# Patient Record
Sex: FEMALE | Race: WHITE | NOT HISPANIC OR LATINO | URBAN - METROPOLITAN AREA
[De-identification: names, ages, dates, MRNs, and addresses within clinical notes are randomized per-mention and may not be internally consistent; named-entity substitution may affect disease eponyms.]

---

## 2020-06-02 ENCOUNTER — LAB OUTSIDE AN ENCOUNTER (OUTPATIENT)
Dept: URBAN - METROPOLITAN AREA CLINIC 80 | Facility: CLINIC | Age: 80
End: 2020-06-02

## 2020-06-15 ENCOUNTER — OFFICE VISIT (OUTPATIENT)
Dept: URBAN - METROPOLITAN AREA CLINIC 80 | Facility: CLINIC | Age: 80
End: 2020-06-15
Payer: MEDICARE

## 2020-06-15 DIAGNOSIS — K59.01 CONSTIPATION: ICD-10-CM

## 2020-06-15 DIAGNOSIS — K82.8 BILIARY DYSKINESIA: ICD-10-CM

## 2020-06-15 DIAGNOSIS — R10.9 ABDOMINAL PAIN: ICD-10-CM

## 2020-06-15 DIAGNOSIS — I16.0 HYPERTENSIVE URGENCY: ICD-10-CM

## 2020-06-15 DIAGNOSIS — R10.31 RLQ ABDOMINAL PAIN: ICD-10-CM

## 2020-06-15 PROCEDURE — G9903 PT SCRN TBCO ID AS NON USER: HCPCS | Performed by: INTERNAL MEDICINE

## 2020-06-15 PROCEDURE — G8427 DOCREV CUR MEDS BY ELIG CLIN: HCPCS | Performed by: INTERNAL MEDICINE

## 2020-06-15 PROCEDURE — 99214 OFFICE O/P EST MOD 30 MIN: CPT | Performed by: INTERNAL MEDICINE

## 2020-06-15 PROCEDURE — G8417 CALC BMI ABV UP PARAM F/U: HCPCS | Performed by: INTERNAL MEDICINE

## 2020-06-15 RX ORDER — LIDOCAINE 50 MG/G
PATCH CUTANEOUS
Qty: 0 | Refills: 0 | Status: ACTIVE | COMMUNITY
Start: 1900-01-01

## 2020-06-15 RX ORDER — ERGOCALCIFEROL 1.25 MG/1
CAPSULE ORAL
Qty: 0 | Refills: 0 | Status: ACTIVE | COMMUNITY
Start: 1900-01-01

## 2020-06-15 NOTE — HPI-TODAY'S VISIT:
History Of Present Illness     79 yr old female seen previously for constipation and abdominal pain last seen in 3/2020 comes in for follow up. previously seen for worsening diarrhea and post-prandial gas and pain.  prior work up done with normal cbc, cmp.  unremarkable ruq u/s.  no relief with bentyl.  zantac helps.  egd in 1/2019 with gastritis but no hp.  she had a ct in 4/2019 with no acute findings.  stable liver cysts.  normal gallbladder, pancreas, kidneys.  hida was ordered but not done.  fecal elastace was normal on 3/2020.  zantac bid has not been helpful. she comes in for ongoing symptoms.  now noted having constipation primarily stable on benefiber and magnesium she never took the xifaxan still with rlq pain that is dull and moving.  worse with eating had hida 2 weeks ago.  mildly abnormal function.  denies any pain or discomfort with the test she notes normal appetite along with early satiety.   no further n/v.  of note, her bp is 200/80.  no chest pain. no headache.  no blurry visition.  notes issues with tooth abscess and has appt with dentist today.  discussed need for er evaluation.    prior work up with EGD in 1/2019 notable for gastritis and no hpylori.  colonoscopy in 6/2018 with diverticulosis but no polyps.    pt s/p total hysterecotomy.   no new complaints.

## 2020-07-07 ENCOUNTER — OFFICE VISIT (OUTPATIENT)
Dept: URBAN - METROPOLITAN AREA CLINIC 80 | Facility: CLINIC | Age: 80
End: 2020-07-07

## 2020-07-08 ENCOUNTER — TELEPHONE ENCOUNTER (OUTPATIENT)
Dept: URBAN - METROPOLITAN AREA CLINIC 92 | Facility: CLINIC | Age: 80
End: 2020-07-08

## 2020-07-13 ENCOUNTER — OFFICE VISIT (OUTPATIENT)
Dept: URBAN - METROPOLITAN AREA CLINIC 80 | Facility: CLINIC | Age: 80
End: 2020-07-13
Payer: MEDICARE

## 2020-07-13 ENCOUNTER — WEB ENCOUNTER (OUTPATIENT)
Dept: URBAN - METROPOLITAN AREA CLINIC 80 | Facility: CLINIC | Age: 80
End: 2020-07-13

## 2020-07-13 DIAGNOSIS — R14.0 BLOATING: ICD-10-CM

## 2020-07-13 DIAGNOSIS — R10.84 ABDOMINAL PAIN, GENERALIZED: ICD-10-CM

## 2020-07-13 DIAGNOSIS — R10.31 RLQ ABDOMINAL PAIN: ICD-10-CM

## 2020-07-13 DIAGNOSIS — K82.8 BILIARY DYSKINESIA: ICD-10-CM

## 2020-07-13 DIAGNOSIS — K59.01 CONSTIPATION: ICD-10-CM

## 2020-07-13 PROBLEM — 443482000 HYPERTENSIVE URGENCY: Status: ACTIVE | Noted: 2020-06-15

## 2020-07-13 PROCEDURE — 1036F TOBACCO NON-USER: CPT | Performed by: INTERNAL MEDICINE

## 2020-07-13 PROCEDURE — G8427 DOCREV CUR MEDS BY ELIG CLIN: HCPCS | Performed by: INTERNAL MEDICINE

## 2020-07-13 PROCEDURE — G9903 PT SCRN TBCO ID AS NON USER: HCPCS | Performed by: INTERNAL MEDICINE

## 2020-07-13 PROCEDURE — 99214 OFFICE O/P EST MOD 30 MIN: CPT | Performed by: INTERNAL MEDICINE

## 2020-07-13 PROCEDURE — G8417 CALC BMI ABV UP PARAM F/U: HCPCS | Performed by: INTERNAL MEDICINE

## 2020-07-13 RX ORDER — ERGOCALCIFEROL 1.25 MG/1
CAPSULE ORAL
Qty: 0 | Refills: 0 | Status: ACTIVE | COMMUNITY
Start: 1900-01-01

## 2020-07-13 RX ORDER — NITAZOXANIDE 500 MG/1
1 TABLET WITH FOOD TABLET ORAL
Qty: 6 TABLET | Refills: 0 | OUTPATIENT
Start: 2020-07-13

## 2020-07-13 RX ORDER — LIDOCAINE 50 MG/G
PATCH CUTANEOUS
Qty: 0 | Refills: 0 | Status: ACTIVE | COMMUNITY
Start: 1900-01-01

## 2020-07-13 RX ORDER — LISINOPRIL 10 MG/1
1 TABLET TABLET ORAL ONCE A DAY
Status: ACTIVE | COMMUNITY

## 2020-10-13 ENCOUNTER — OFFICE VISIT (OUTPATIENT)
Dept: URBAN - METROPOLITAN AREA CLINIC 80 | Facility: CLINIC | Age: 80
End: 2020-10-13
Payer: MEDICARE

## 2020-10-13 DIAGNOSIS — K82.8 BILIARY DYSKINESIA: ICD-10-CM

## 2020-10-13 DIAGNOSIS — K59.01 CONSTIPATION: ICD-10-CM

## 2020-10-13 DIAGNOSIS — Z86.010 HISTORY OF COLON POLYPS: ICD-10-CM

## 2020-10-13 DIAGNOSIS — R10.31 RLQ ABDOMINAL PAIN: ICD-10-CM

## 2020-10-13 DIAGNOSIS — R14.0 BLOATING: ICD-10-CM

## 2020-10-13 DIAGNOSIS — K21.9 GERD: ICD-10-CM

## 2020-10-13 DIAGNOSIS — R68.84 JAW PAIN: ICD-10-CM

## 2020-10-13 DIAGNOSIS — R10.9 ABDOMINAL PAIN: ICD-10-CM

## 2020-10-13 PROCEDURE — G9903 PT SCRN TBCO ID AS NON USER: HCPCS | Performed by: INTERNAL MEDICINE

## 2020-10-13 PROCEDURE — G8417 CALC BMI ABV UP PARAM F/U: HCPCS | Performed by: INTERNAL MEDICINE

## 2020-10-13 PROCEDURE — G8427 DOCREV CUR MEDS BY ELIG CLIN: HCPCS | Performed by: INTERNAL MEDICINE

## 2020-10-13 PROCEDURE — 99213 OFFICE O/P EST LOW 20 MIN: CPT | Performed by: INTERNAL MEDICINE

## 2020-10-13 PROCEDURE — G8484 FLU IMMUNIZE NO ADMIN: HCPCS | Performed by: INTERNAL MEDICINE

## 2020-10-13 RX ORDER — LISINOPRIL 10 MG/1
1 TABLET TABLET ORAL ONCE A DAY
Status: ACTIVE | COMMUNITY

## 2020-10-13 RX ORDER — NITAZOXANIDE 500 MG/1
1 TABLET WITH FOOD TABLET ORAL
Qty: 6 TABLET | Refills: 0 | Status: ACTIVE | COMMUNITY
Start: 2020-07-13

## 2020-10-13 RX ORDER — LIDOCAINE 50 MG/G
PATCH CUTANEOUS
Qty: 0 | Refills: 0 | Status: ACTIVE | COMMUNITY
Start: 1900-01-01

## 2020-10-13 RX ORDER — ERGOCALCIFEROL 1.25 MG/1
CAPSULE ORAL
Qty: 0 | Refills: 0 | Status: ACTIVE | COMMUNITY
Start: 1900-01-01

## 2020-10-13 NOTE — HPI-TODAY'S VISIT:
80 yr old female seen previously for constipation and abdominal pain last seen in 7/2020 comes in for follow up.  previously seen for worsening diarrhea and post-prandial gas and pain.  prior work up done with normal cbc, cmp.  unremarkable ruq u/s.  no relief with bentyl.  zantac helps.  egd in 1/2019 with gastritis but no hp.  she had a ct in 4/2019 with no acute findings.  stable liver cysts.  normal gallbladder, pancreas, kidneys.  hida was ordered but not done.  fecal elastace was normal on 3/2020.  zantac bid has not been helpful. given xifaxan for sibo but didn't tolerated it.  i gave her alinia last time.  she comes in for ongoing symptoms.  she comes in primarily complaining of continued jaw pain she has had a tooth pulled without relief she did not take the alinia.  she was given pain med and neurontin for her pain and didn't want to mix the meds her jaw pain has not improved.   she notes that her constipation is improved on benefiber and magnesium she did have some blood a few weeks ago with brb on wiping that lasted 2 days sounds like hemorrhoids given normal brown stools still notes occ bloating eating well no rlq pain normal appetite  no further n/v no dysphagia no new complaints.  prior work up with EGD in 1/2019 notable for gastritis and no hpylori.  colonoscopy in 6/2018 with diverticulosis but no polyps.  hida with mildly abnormal function.  no pain during test.  pt s/p total hysterecotomy.

## 2021-01-12 ENCOUNTER — OFFICE VISIT (OUTPATIENT)
Dept: URBAN - METROPOLITAN AREA SURGERY CENTER 19 | Facility: SURGERY CENTER | Age: 81
End: 2021-01-12

## 2021-01-19 ENCOUNTER — OFFICE VISIT (OUTPATIENT)
Dept: URBAN - METROPOLITAN AREA CLINIC 80 | Facility: CLINIC | Age: 81
End: 2021-01-19
Payer: MEDICARE

## 2021-01-19 VITALS
WEIGHT: 154.8 LBS | HEART RATE: 70 BPM | SYSTOLIC BLOOD PRESSURE: 118 MMHG | BODY MASS INDEX: 26.43 KG/M2 | HEIGHT: 64 IN | DIASTOLIC BLOOD PRESSURE: 78 MMHG | TEMPERATURE: 97.3 F

## 2021-01-19 DIAGNOSIS — R14.0 BLOATING: ICD-10-CM

## 2021-01-19 DIAGNOSIS — K21.9 GERD: ICD-10-CM

## 2021-01-19 DIAGNOSIS — R68.84 JAW PAIN: ICD-10-CM

## 2021-01-19 DIAGNOSIS — Z86.010 HISTORY OF COLON POLYPS: ICD-10-CM

## 2021-01-19 DIAGNOSIS — R10.31 RLQ ABDOMINAL PAIN: ICD-10-CM

## 2021-01-19 DIAGNOSIS — R10.9 ABDOMINAL PAIN: ICD-10-CM

## 2021-01-19 DIAGNOSIS — K59.01 CONSTIPATION: ICD-10-CM

## 2021-01-19 DIAGNOSIS — K82.8 BILIARY DYSKINESIA: ICD-10-CM

## 2021-01-19 PROCEDURE — 99214 OFFICE O/P EST MOD 30 MIN: CPT | Performed by: INTERNAL MEDICINE

## 2021-01-19 PROCEDURE — G8427 DOCREV CUR MEDS BY ELIG CLIN: HCPCS | Performed by: INTERNAL MEDICINE

## 2021-01-19 PROCEDURE — G9903 PT SCRN TBCO ID AS NON USER: HCPCS | Performed by: INTERNAL MEDICINE

## 2021-01-19 PROCEDURE — G8417 CALC BMI ABV UP PARAM F/U: HCPCS | Performed by: INTERNAL MEDICINE

## 2021-01-19 PROCEDURE — G8482 FLU IMMUNIZE ORDER/ADMIN: HCPCS | Performed by: INTERNAL MEDICINE

## 2021-01-19 RX ORDER — NEBIVOLOL HYDROCHLORIDE 10 MG/1
1 TABLET TABLET ORAL ONCE A DAY
Refills: 0 | Status: ACTIVE | COMMUNITY
Start: 1900-01-01

## 2021-01-19 RX ORDER — LIDOCAINE 50 MG/G
PATCH CUTANEOUS
Qty: 0 | Refills: 0 | Status: ACTIVE | COMMUNITY
Start: 1900-01-01

## 2021-01-19 RX ORDER — LORAZEPAM 1 MG/1
1 TABLET AT BEDTIME AS NEEDED TABLET ORAL ONCE A DAY
Refills: 0 | Status: ACTIVE | COMMUNITY
Start: 1900-01-01

## 2021-01-19 RX ORDER — ESTRADIOL 0.03 MG/D
1 PATCH TO SKIN PATCH TRANSDERMAL
Refills: 0 | Status: ACTIVE | COMMUNITY
Start: 1900-01-01

## 2021-01-19 RX ORDER — NITAZOXANIDE 500 MG/1
1 TABLET WITH FOOD TABLET ORAL
Qty: 6 TABLET | Refills: 0 | Status: ON HOLD | COMMUNITY
Start: 2020-07-13

## 2021-01-19 RX ORDER — LEVOTHYROXINE SODIUM 0.05 MG/1
1 TABLET IN THE MORNING ON AN EMPTY STOMACH TABLET ORAL ONCE A DAY
Status: ACTIVE | COMMUNITY

## 2021-01-19 RX ORDER — PRAVASTATIN SODIUM 40 MG/1
1 TABLET TABLET ORAL ONCE A DAY
Status: ACTIVE | COMMUNITY

## 2021-01-19 RX ORDER — TOBRAMYCIN AND DEXAMETHASONE 3; 1 MG/G; MG/G
1 APPLICATION INTO THE LOWER EYELID OF AFFECTED EYE OINTMENT OPHTHALMIC
Refills: 0 | Status: ACTIVE | COMMUNITY
Start: 1900-01-01

## 2021-01-19 RX ORDER — LISINOPRIL 10 MG/1
1 TABLET TABLET ORAL ONCE A DAY
Status: ACTIVE | COMMUNITY

## 2021-01-19 RX ORDER — ERGOCALCIFEROL 1.25 MG/1
CAPSULE ORAL
Qty: 0 | Refills: 0 | Status: ACTIVE | COMMUNITY
Start: 1900-01-01

## 2021-01-19 NOTE — HPI-TODAY'S VISIT:
80 yr old female seen previously for constipation and abdominal pain last seen in 10/2020 comes in for follow up.   last seen for her abdominal pain prevoiusly improved on nexium has stopped it since due to side effect from nexium but unsure what the side effect was still on protonix with mimimal relief pain is rlq area.  worse with prolonged sitting and with standing unrelated to eating or bm no dysphagia normal appetite stable weight.  normal stools with no diarrhea or constipaion prior hx of ibs but stable on fiber  prior work up done with normal cbc, cmp.  unremarkable ruq u/s.  no relief with bentyl.  zantac helps.   egd in 1/2019 with gastritis but no hp.   she had a ct in 4/2019 with no acute findings.  stable liver cysts.  normal gallbladder, pancreas, kidneys.   hida in 2020 with EF of 32% but had no pain during the test.   fecal elastace was normal on 3/2020. given xifaxan for sibo but didn't tolerated it.    prior work up with EGD in 1/2019 notable for gastritis and no hpylori.  colonoscopy in 6/2018 with diverticulosis but no polyps.   pt s/p total hysterecotomy.

## 2021-07-26 ENCOUNTER — OFFICE VISIT (OUTPATIENT)
Dept: URBAN - METROPOLITAN AREA CLINIC 80 | Facility: CLINIC | Age: 81
End: 2021-07-26
Payer: MEDICARE

## 2021-07-26 VITALS
HEART RATE: 54 BPM | WEIGHT: 155.6 LBS | HEIGHT: 64 IN | DIASTOLIC BLOOD PRESSURE: 71 MMHG | BODY MASS INDEX: 26.56 KG/M2 | SYSTOLIC BLOOD PRESSURE: 167 MMHG | TEMPERATURE: 97 F

## 2021-07-26 DIAGNOSIS — K21.9 GERD: ICD-10-CM

## 2021-07-26 DIAGNOSIS — K59.01 CONSTIPATION: ICD-10-CM

## 2021-07-26 DIAGNOSIS — K82.8 BILIARY DYSKINESIA: ICD-10-CM

## 2021-07-26 DIAGNOSIS — R10.9 ABDOMINAL PAIN: ICD-10-CM

## 2021-07-26 DIAGNOSIS — R14.0 BLOATING: ICD-10-CM

## 2021-07-26 DIAGNOSIS — R10.31 RLQ ABDOMINAL PAIN: ICD-10-CM

## 2021-07-26 DIAGNOSIS — R68.84 JAW PAIN: ICD-10-CM

## 2021-07-26 DIAGNOSIS — Z86.010 HISTORY OF COLON POLYPS: ICD-10-CM

## 2021-07-26 PROCEDURE — 99213 OFFICE O/P EST LOW 20 MIN: CPT | Performed by: INTERNAL MEDICINE

## 2021-07-26 RX ORDER — PRAVASTATIN SODIUM 40 MG/1
1 TABLET TABLET ORAL ONCE A DAY
Status: ACTIVE | COMMUNITY

## 2021-07-26 RX ORDER — LIDOCAINE 50 MG/G
PATCH CUTANEOUS
Qty: 0 | Refills: 0 | Status: ACTIVE | COMMUNITY
Start: 1900-01-01

## 2021-07-26 RX ORDER — SODIUM SULFATE, MAGNESIUM SULFATE, AND POTASSIUM CHLORIDE 17.75; 2.7; 2.25 G/1; G/1; G/1
12 TABLETS TABLET ORAL
Qty: 24 TABLETS | Refills: 0 | OUTPATIENT
Start: 2021-07-26 | End: 2021-07-27

## 2021-07-26 RX ORDER — ESTRADIOL 0.03 MG/D
1 PATCH TO SKIN PATCH TRANSDERMAL
Refills: 0 | Status: ACTIVE | COMMUNITY
Start: 1900-01-01

## 2021-07-26 RX ORDER — NITAZOXANIDE 500 MG/1
1 TABLET WITH FOOD TABLET ORAL
Qty: 6 TABLET | Refills: 0 | Status: ON HOLD | COMMUNITY
Start: 2020-07-13

## 2021-07-26 RX ORDER — LISINOPRIL 10 MG/1
1 TABLET TABLET ORAL ONCE A DAY
Status: ACTIVE | COMMUNITY

## 2021-07-26 RX ORDER — LORAZEPAM 1 MG/1
1 TABLET AT BEDTIME AS NEEDED TABLET ORAL ONCE A DAY
Refills: 0 | Status: ACTIVE | COMMUNITY
Start: 1900-01-01

## 2021-07-26 RX ORDER — ERGOCALCIFEROL 1.25 MG/1
CAPSULE ORAL
Qty: 0 | Refills: 0 | Status: ACTIVE | COMMUNITY
Start: 1900-01-01

## 2021-07-26 RX ORDER — NEBIVOLOL HYDROCHLORIDE 10 MG/1
1 TABLET TABLET ORAL ONCE A DAY
Refills: 0 | Status: ACTIVE | COMMUNITY
Start: 1900-01-01

## 2021-07-26 RX ORDER — LEVOTHYROXINE SODIUM 0.05 MG/1
1 TABLET IN THE MORNING ON AN EMPTY STOMACH TABLET ORAL ONCE A DAY
Status: ACTIVE | COMMUNITY

## 2021-07-26 RX ORDER — TOBRAMYCIN AND DEXAMETHASONE 3; 1 MG/G; MG/G
1 APPLICATION INTO THE LOWER EYELID OF AFFECTED EYE OINTMENT OPHTHALMIC
Refills: 0 | Status: ACTIVE | COMMUNITY
Start: 1900-01-01

## 2021-07-26 NOTE — HPI-TODAY'S VISIT:
80 yr old female seen previously for constipation and abdominal pain last seen in 10/2020 comes in for follow up.    last seen in 1/2021 for ruq abd pain.  thought to be functional vs mesh vs arthritis.  prevoiusly improved on nexium has stopped it since due to side effect from nexium but unsure what the side effect was still on protonix with mimimal relief pain is rlq area.  worse with prolonged sitting and with standing saw dr. nabila peter who did not think it was related to the mesh she was seen by neurology for her jaw pain.  given neurontin 600mg tid she comes in with improved jaw pain but no relief from ruq pain pain is unrelated to eating or bm occurs all day eating well no dysphagia normal appetite stable weight. stools are normal no diarrhea or constipaion prior hx of ibs but stable on fiber  prior work up done with normal cbc, cmp.  unremarkable ruq u/s.  no relief with bentyl.  zantac helps.   egd in 1/2019 with gastritis but no hp.   she had a ct in 4/2019 with no acute findings.  stable liver cysts.  normal gallbladder, pancreas, kidneys.   hida in 2020 with EF of 32% but had no pain during the test.   fecal elastace was normal on 3/2020. given xifaxan for sibo but didn't tolerated it.   colon in 2018 with diverticulosis but no polyps   pt s/p total hysterecotomy.

## 2021-07-29 ENCOUNTER — TELEPHONE ENCOUNTER (OUTPATIENT)
Dept: URBAN - METROPOLITAN AREA CLINIC 23 | Facility: CLINIC | Age: 81
End: 2021-07-29

## 2021-08-02 ENCOUNTER — TELEPHONE ENCOUNTER (OUTPATIENT)
Dept: URBAN - METROPOLITAN AREA CLINIC 92 | Facility: CLINIC | Age: 81
End: 2021-08-02

## 2021-08-02 RX ORDER — POLYETHYLENE GLYCOL 3350, SODIUM SULFATE, SODIUM CHLORIDE, POTASSIUM CHLORIDE, ASCORBIC ACID, SODIUM ASCORBATE 140-9-5.2G
AS DIRECTED KIT ORAL ONCE
Qty: 140 GRAM | Refills: 0 | OUTPATIENT
Start: 2021-08-02 | End: 2021-08-03

## 2021-10-07 ENCOUNTER — TELEPHONE ENCOUNTER (OUTPATIENT)
Dept: URBAN - METROPOLITAN AREA CLINIC 40 | Facility: CLINIC | Age: 81
End: 2021-10-07

## 2021-10-19 ENCOUNTER — OFFICE VISIT (OUTPATIENT)
Dept: URBAN - METROPOLITAN AREA SURGERY CENTER 19 | Facility: SURGERY CENTER | Age: 81
End: 2021-10-19
Payer: MEDICARE

## 2021-10-19 ENCOUNTER — CLAIMS CREATED FROM THE CLAIM WINDOW (OUTPATIENT)
Dept: URBAN - METROPOLITAN AREA CLINIC 4 | Facility: CLINIC | Age: 81
End: 2021-10-19
Payer: MEDICARE

## 2021-10-19 DIAGNOSIS — D12.2 ADENOMA OF ASCENDING COLON: ICD-10-CM

## 2021-10-19 DIAGNOSIS — R10.84 ABDOMINAL CRAMPING, GENERALIZED: ICD-10-CM

## 2021-10-19 DIAGNOSIS — K63.89 POLYP, HYPERPLASTIC: ICD-10-CM

## 2021-10-19 PROCEDURE — 45385 COLONOSCOPY W/LESION REMOVAL: CPT | Performed by: INTERNAL MEDICINE

## 2021-10-19 PROCEDURE — G8907 PT DOC NO EVENTS ON DISCHARG: HCPCS | Performed by: INTERNAL MEDICINE

## 2021-10-19 PROCEDURE — 88305 TISSUE EXAM BY PATHOLOGIST: CPT | Performed by: PATHOLOGY

## 2021-10-19 RX ORDER — PRAVASTATIN SODIUM 40 MG/1
1 TABLET TABLET ORAL ONCE A DAY
COMMUNITY

## 2021-10-19 RX ORDER — ERGOCALCIFEROL 1.25 MG/1
CAPSULE ORAL
Qty: 0 | Refills: 0 | COMMUNITY
Start: 1900-01-01

## 2021-10-19 RX ORDER — LORAZEPAM 1 MG/1
1 TABLET AT BEDTIME AS NEEDED TABLET ORAL ONCE A DAY
Refills: 0 | COMMUNITY
Start: 1900-01-01

## 2021-10-19 RX ORDER — NITAZOXANIDE 500 MG/1
1 TABLET WITH FOOD TABLET ORAL
Qty: 6 TABLET | Refills: 0 | COMMUNITY
Start: 2020-07-13

## 2021-10-19 RX ORDER — NEBIVOLOL HYDROCHLORIDE 10 MG/1
1 TABLET TABLET ORAL ONCE A DAY
Refills: 0 | COMMUNITY
Start: 1900-01-01

## 2021-10-19 RX ORDER — TOBRAMYCIN AND DEXAMETHASONE 3; 1 MG/G; MG/G
1 APPLICATION INTO THE LOWER EYELID OF AFFECTED EYE OINTMENT OPHTHALMIC
Refills: 0 | COMMUNITY
Start: 1900-01-01

## 2021-10-19 RX ORDER — ESTRADIOL 0.03 MG/D
1 PATCH TO SKIN PATCH TRANSDERMAL
Refills: 0 | COMMUNITY
Start: 1900-01-01

## 2021-10-19 RX ORDER — LISINOPRIL 10 MG/1
1 TABLET TABLET ORAL ONCE A DAY
COMMUNITY

## 2021-10-19 RX ORDER — LIDOCAINE 50 MG/G
PATCH CUTANEOUS
Qty: 0 | Refills: 0 | COMMUNITY
Start: 1900-01-01

## 2021-10-19 RX ORDER — LEVOTHYROXINE SODIUM 0.05 MG/1
1 TABLET IN THE MORNING ON AN EMPTY STOMACH TABLET ORAL ONCE A DAY
COMMUNITY

## 2021-10-25 ENCOUNTER — OFFICE VISIT (OUTPATIENT)
Dept: URBAN - METROPOLITAN AREA CLINIC 80 | Facility: CLINIC | Age: 81
End: 2021-10-25

## 2021-10-25 RX ORDER — LORAZEPAM 1 MG/1
1 TABLET AT BEDTIME AS NEEDED TABLET ORAL ONCE A DAY
Refills: 0 | Status: ACTIVE | COMMUNITY
Start: 1900-01-01

## 2021-10-25 RX ORDER — ERGOCALCIFEROL 1.25 MG/1
CAPSULE ORAL
Qty: 0 | Refills: 0 | Status: ACTIVE | COMMUNITY
Start: 1900-01-01

## 2021-10-25 RX ORDER — NITAZOXANIDE 500 MG/1
1 TABLET WITH FOOD TABLET ORAL
Qty: 6 TABLET | Refills: 0 | COMMUNITY
Start: 2020-07-13

## 2021-10-25 RX ORDER — LIDOCAINE 50 MG/G
PATCH CUTANEOUS
Qty: 0 | Refills: 0 | Status: ACTIVE | COMMUNITY
Start: 1900-01-01

## 2021-10-25 RX ORDER — ESTRADIOL 0.03 MG/D
1 PATCH TO SKIN PATCH TRANSDERMAL
Refills: 0 | Status: ACTIVE | COMMUNITY
Start: 1900-01-01

## 2021-10-25 RX ORDER — LEVOTHYROXINE SODIUM 0.05 MG/1
1 TABLET IN THE MORNING ON AN EMPTY STOMACH TABLET ORAL ONCE A DAY
Status: ACTIVE | COMMUNITY

## 2021-10-25 RX ORDER — NEBIVOLOL HYDROCHLORIDE 10 MG/1
1 TABLET TABLET ORAL ONCE A DAY
Refills: 0 | Status: ACTIVE | COMMUNITY
Start: 1900-01-01

## 2021-10-25 RX ORDER — LISINOPRIL 10 MG/1
1 TABLET TABLET ORAL ONCE A DAY
Status: ACTIVE | COMMUNITY

## 2021-10-25 RX ORDER — TOBRAMYCIN AND DEXAMETHASONE 3; 1 MG/G; MG/G
1 APPLICATION INTO THE LOWER EYELID OF AFFECTED EYE OINTMENT OPHTHALMIC
Refills: 0 | Status: ACTIVE | COMMUNITY
Start: 1900-01-01

## 2021-10-25 RX ORDER — PRAVASTATIN SODIUM 40 MG/1
1 TABLET TABLET ORAL ONCE A DAY
Status: ACTIVE | COMMUNITY

## 2021-11-02 ENCOUNTER — TELEPHONE ENCOUNTER (OUTPATIENT)
Dept: URBAN - METROPOLITAN AREA SURGERY CENTER 19 | Facility: SURGERY CENTER | Age: 81
End: 2021-11-02

## 2021-11-23 ENCOUNTER — OFFICE VISIT (OUTPATIENT)
Dept: URBAN - METROPOLITAN AREA CLINIC 80 | Facility: CLINIC | Age: 81
End: 2021-11-23
Payer: MEDICARE

## 2021-11-23 DIAGNOSIS — R68.84 JAW PAIN: ICD-10-CM

## 2021-11-23 DIAGNOSIS — Z86.010 HISTORY OF COLON POLYPS: ICD-10-CM

## 2021-11-23 DIAGNOSIS — K59.01 CONSTIPATION: ICD-10-CM

## 2021-11-23 DIAGNOSIS — R14.0 BLOATING: ICD-10-CM

## 2021-11-23 DIAGNOSIS — R10.31 RLQ ABDOMINAL PAIN: ICD-10-CM

## 2021-11-23 DIAGNOSIS — K82.8 BILIARY DYSKINESIA: ICD-10-CM

## 2021-11-23 DIAGNOSIS — R10.9 ABDOMINAL PAIN: ICD-10-CM

## 2021-11-23 DIAGNOSIS — K21.9 GERD: ICD-10-CM

## 2021-11-23 PROCEDURE — 99214 OFFICE O/P EST MOD 30 MIN: CPT | Performed by: INTERNAL MEDICINE

## 2021-11-23 RX ORDER — ERGOCALCIFEROL 1.25 MG/1
CAPSULE ORAL
Qty: 0 | Refills: 0 | Status: ACTIVE | COMMUNITY
Start: 1900-01-01

## 2021-11-23 RX ORDER — NEBIVOLOL HYDROCHLORIDE 10 MG/1
1 TABLET TABLET ORAL ONCE A DAY
Refills: 0 | Status: ACTIVE | COMMUNITY
Start: 1900-01-01

## 2021-11-23 RX ORDER — ESTRADIOL 0.03 MG/D
1 PATCH TO SKIN PATCH TRANSDERMAL
Refills: 0 | Status: ACTIVE | COMMUNITY
Start: 1900-01-01

## 2021-11-23 RX ORDER — METRONIDAZOLE 500 MG/1
1 TABLET TABLET ORAL THREE TIMES A DAY
Qty: 42 TABLET | Refills: 1 | OUTPATIENT
Start: 2021-11-23 | End: 2021-12-21

## 2021-11-23 RX ORDER — NIACIN 500 MG
1 TABLET WITH FOOD TABLET ORAL ONCE A DAY
Refills: 0 | Status: ACTIVE | COMMUNITY
Start: 1900-01-01

## 2021-11-23 RX ORDER — LISINOPRIL 10 MG/1
1 TABLET TABLET ORAL ONCE A DAY
Status: ACTIVE | COMMUNITY

## 2021-11-23 RX ORDER — LEVOTHYROXINE SODIUM 0.05 MG/1
1 TABLET IN THE MORNING ON AN EMPTY STOMACH TABLET ORAL ONCE A DAY
Status: ACTIVE | COMMUNITY

## 2021-11-23 RX ORDER — NITAZOXANIDE 500 MG/1
1 TABLET WITH FOOD TABLET ORAL
Qty: 6 TABLET | Refills: 0 | Status: ON HOLD | COMMUNITY
Start: 2020-07-13

## 2021-11-23 RX ORDER — TOBRAMYCIN AND DEXAMETHASONE 3; 1 MG/G; MG/G
1 APPLICATION INTO THE LOWER EYELID OF AFFECTED EYE OINTMENT OPHTHALMIC
Refills: 0 | Status: ON HOLD | COMMUNITY
Start: 1900-01-01

## 2021-11-23 RX ORDER — LORAZEPAM 1 MG/1
1 TABLET AT BEDTIME AS NEEDED TABLET ORAL ONCE A DAY
Refills: 0 | Status: ACTIVE | COMMUNITY
Start: 1900-01-01

## 2021-11-23 RX ORDER — PRAVASTATIN SODIUM 20 MG/1
1 TABLET TABLET ORAL ONCE A DAY
Status: ACTIVE | COMMUNITY

## 2021-11-23 RX ORDER — LIDOCAINE 50 MG/G
PATCH CUTANEOUS
Qty: 0 | Refills: 0 | Status: ACTIVE | COMMUNITY
Start: 1900-01-01

## 2021-11-23 RX ORDER — GABAPENTIN 600 MG/1
1 TABLET TABLET, FILM COATED ORAL TID
Status: ACTIVE | COMMUNITY

## 2021-11-23 RX ORDER — ST. JOHN'S WORT 300 MG
AS DIRECTED CAPSULE ORAL
Status: ACTIVE | COMMUNITY

## 2022-03-22 ENCOUNTER — WEB ENCOUNTER (OUTPATIENT)
Dept: URBAN - METROPOLITAN AREA CLINIC 80 | Facility: CLINIC | Age: 82
End: 2022-03-22

## 2022-03-25 ENCOUNTER — OFFICE VISIT (OUTPATIENT)
Dept: URBAN - METROPOLITAN AREA CLINIC 80 | Facility: CLINIC | Age: 82
End: 2022-03-25
Payer: MEDICARE

## 2022-03-25 VITALS
DIASTOLIC BLOOD PRESSURE: 68 MMHG | BODY MASS INDEX: 26.89 KG/M2 | SYSTOLIC BLOOD PRESSURE: 165 MMHG | HEIGHT: 64 IN | TEMPERATURE: 96.8 F | HEART RATE: 51 BPM | WEIGHT: 157.5 LBS

## 2022-03-25 DIAGNOSIS — K21.9 GERD: ICD-10-CM

## 2022-03-25 DIAGNOSIS — Z86.010 HISTORY OF COLON POLYPS: ICD-10-CM

## 2022-03-25 DIAGNOSIS — R14.0 BLOATING: ICD-10-CM

## 2022-03-25 DIAGNOSIS — R10.31 RLQ ABDOMINAL PAIN: ICD-10-CM

## 2022-03-25 DIAGNOSIS — R10.9 ABDOMINAL PAIN: ICD-10-CM

## 2022-03-25 DIAGNOSIS — K82.8 BILIARY DYSKINESIA: ICD-10-CM

## 2022-03-25 DIAGNOSIS — R68.84 JAW PAIN: ICD-10-CM

## 2022-03-25 DIAGNOSIS — K59.01 CONSTIPATION: ICD-10-CM

## 2022-03-25 PROCEDURE — 99213 OFFICE O/P EST LOW 20 MIN: CPT | Performed by: INTERNAL MEDICINE

## 2022-03-25 RX ORDER — PRAVASTATIN SODIUM 20 MG/1
1 TABLET TABLET ORAL ONCE A DAY
Status: ACTIVE | COMMUNITY

## 2022-03-25 RX ORDER — NEBIVOLOL HYDROCHLORIDE 10 MG/1
1 TABLET TABLET ORAL ONCE A DAY
Refills: 0 | Status: ACTIVE | COMMUNITY
Start: 1900-01-01

## 2022-03-25 RX ORDER — ERGOCALCIFEROL 1.25 MG/1
CAPSULE ORAL
Qty: 0 | Refills: 0 | Status: ACTIVE | COMMUNITY
Start: 1900-01-01

## 2022-03-25 RX ORDER — DESIPRAMINE HYDROCHLORIDE 10 MG/1
1 TABLET TABLET, FILM COATED ORAL ONCE A DAY
Qty: 90 | Refills: 3 | OUTPATIENT
Start: 2022-03-25

## 2022-03-25 RX ORDER — LIDOCAINE 50 MG/G
PATCH CUTANEOUS
Qty: 0 | Refills: 0 | Status: ACTIVE | COMMUNITY
Start: 1900-01-01

## 2022-03-25 RX ORDER — ST. JOHN'S WORT 300 MG
AS DIRECTED CAPSULE ORAL
Status: ACTIVE | COMMUNITY

## 2022-03-25 RX ORDER — NIACIN 500 MG
1 TABLET WITH FOOD TABLET ORAL ONCE A DAY
Refills: 0 | Status: ACTIVE | COMMUNITY
Start: 1900-01-01

## 2022-03-25 RX ORDER — GABAPENTIN 600 MG/1
1 TABLET TABLET, FILM COATED ORAL TID
Status: ACTIVE | COMMUNITY

## 2022-03-25 RX ORDER — LEVOTHYROXINE SODIUM 0.05 MG/1
1 TABLET IN THE MORNING ON AN EMPTY STOMACH TABLET ORAL ONCE A DAY
Status: ACTIVE | COMMUNITY

## 2022-03-25 RX ORDER — LORAZEPAM 1 MG/1
1 TABLET AT BEDTIME AS NEEDED TABLET ORAL ONCE A DAY
Refills: 0 | Status: ACTIVE | COMMUNITY
Start: 1900-01-01

## 2022-03-25 RX ORDER — LISINOPRIL 10 MG/1
1 TABLET TABLET ORAL ONCE A DAY
Status: ACTIVE | COMMUNITY

## 2022-03-25 RX ORDER — ESTRADIOL 0.03 MG/D
1 PATCH TO SKIN PATCH TRANSDERMAL
Refills: 0 | Status: ACTIVE | COMMUNITY
Start: 1900-01-01

## 2022-03-25 RX ORDER — NITAZOXANIDE 500 MG/1
1 TABLET WITH FOOD TABLET ORAL
Qty: 6 TABLET | Refills: 0 | Status: ON HOLD | COMMUNITY
Start: 2020-07-13

## 2022-03-25 RX ORDER — TOBRAMYCIN AND DEXAMETHASONE 3; 1 MG/G; MG/G
1 APPLICATION INTO THE LOWER EYELID OF AFFECTED EYE OINTMENT OPHTHALMIC
Refills: 0 | Status: ON HOLD | COMMUNITY
Start: 1900-01-01

## 2022-03-25 NOTE — HPI-TODAY'S VISIT:
81 yr old female seen previously in 11/2021 for abd pain previously followed for constipation and abdominal pain  here for follow up a copy of this note will be sent to referring physician.  had ongoing issues with ruq abd pain.  thought to be functional vs mesh vs arthritis.  previously improved on nexium had stopped it since due to side effect from nexium but unsure what the side effect was still on protonix with mimimal relief pain is rlq area.  worse with prolonged sitting and with standing hida with low ef.   saw dr. nabila peter who did not think it was related to the mesh she was seen by neurology for her jaw pain.  given neurontin 600mg tid she comes in with improved jaw pain but no relief from ruq pain since last visit, she fell on 3/8/2022 after passing out went to the ER with unremarkble work up for cardiac causes.  following up with ortho for her left elbow she notes ongoing pain on the right side unrelated to eating or bm seems positional and relates to standing occurs all day otherwise, eating well no dysphagia no n/v normal appetite stable weight. stools are normal no diarrhea or constipaion prior hx of ibs but stable on fiber  prior work up done with normal cbc, cmp.  unremarkable ruq u/s.  no relief with bentyl.  zantac helps.   egd in 1/2019 with gastritis but no hp.   she had a ct in 4/2019 with no acute findings.  stable liver cysts.  normal gallbladder, pancreas, kidneys.   hida in 2020 with EF of 32% but had no pain during the test.   fecal elastace was normal on 3/2020. given xifaxan for sibo but didn't tolerated it.   colon in 2018 with diverticulosis but no polyps   pt s/p total hysterecotomy.

## 2022-04-18 ENCOUNTER — OFFICE VISIT (OUTPATIENT)
Dept: URBAN - METROPOLITAN AREA CLINIC 80 | Facility: CLINIC | Age: 82
End: 2022-04-18

## 2022-09-23 ENCOUNTER — OFFICE VISIT (OUTPATIENT)
Dept: URBAN - METROPOLITAN AREA CLINIC 80 | Facility: CLINIC | Age: 82
End: 2022-09-23

## 2022-10-03 ENCOUNTER — DASHBOARD ENCOUNTERS (OUTPATIENT)
Age: 82
End: 2022-10-03

## 2022-10-03 ENCOUNTER — OFFICE VISIT (OUTPATIENT)
Dept: URBAN - METROPOLITAN AREA CLINIC 80 | Facility: CLINIC | Age: 82
End: 2022-10-03
Payer: MEDICARE

## 2022-10-03 VITALS
SYSTOLIC BLOOD PRESSURE: 178 MMHG | TEMPERATURE: 97.4 F | WEIGHT: 158.5 LBS | BODY MASS INDEX: 27.06 KG/M2 | HEIGHT: 64 IN | DIASTOLIC BLOOD PRESSURE: 67 MMHG | HEART RATE: 56 BPM

## 2022-10-03 DIAGNOSIS — R68.84 JAW PAIN: ICD-10-CM

## 2022-10-03 DIAGNOSIS — K21.9 GERD: ICD-10-CM

## 2022-10-03 DIAGNOSIS — R10.9 ABDOMINAL PAIN: ICD-10-CM

## 2022-10-03 DIAGNOSIS — K92.1 HEMATOCHEZIA: ICD-10-CM

## 2022-10-03 DIAGNOSIS — Z86.010 HISTORY OF COLON POLYPS: ICD-10-CM

## 2022-10-03 DIAGNOSIS — R10.31 RLQ ABDOMINAL PAIN: ICD-10-CM

## 2022-10-03 DIAGNOSIS — K82.8 BILIARY DYSKINESIA: ICD-10-CM

## 2022-10-03 DIAGNOSIS — K64.8 INTERNAL HEMORRHOID: ICD-10-CM

## 2022-10-03 DIAGNOSIS — K59.01 CONSTIPATION: ICD-10-CM

## 2022-10-03 DIAGNOSIS — R14.0 BLOATING: ICD-10-CM

## 2022-10-03 PROBLEM — 235595009 GASTROESOPHAGEAL REFLUX DISEASE: Status: ACTIVE | Noted: 2020-10-13

## 2022-10-03 PROBLEM — 301754002 RIGHT LOWER QUADRANT PAIN: Status: ACTIVE | Noted: 2021-07-27

## 2022-10-03 PROBLEM — 428283002 HISTORY OF POLYP OF COLON: Status: ACTIVE | Noted: 2020-10-13

## 2022-10-03 PROBLEM — 14760008 CONSTIPATION: Status: ACTIVE | Noted: 2021-07-26

## 2022-10-03 PROCEDURE — 99213 OFFICE O/P EST LOW 20 MIN: CPT | Performed by: INTERNAL MEDICINE

## 2022-10-03 RX ORDER — LISINOPRIL 10 MG/1
1 TABLET TABLET ORAL ONCE A DAY
Status: ACTIVE | COMMUNITY

## 2022-10-03 RX ORDER — ERGOCALCIFEROL 1.25 MG/1
CAPSULE ORAL
Qty: 0 | Refills: 0 | Status: ACTIVE | COMMUNITY
Start: 1900-01-01

## 2022-10-03 RX ORDER — NEBIVOLOL HYDROCHLORIDE 10 MG/1
1 TABLET TABLET ORAL ONCE A DAY
Refills: 0 | Status: ACTIVE | COMMUNITY
Start: 1900-01-01

## 2022-10-03 RX ORDER — NITAZOXANIDE 500 MG/1
1 TABLET WITH FOOD TABLET ORAL
Qty: 6 TABLET | Refills: 0 | Status: ON HOLD | COMMUNITY
Start: 2020-07-13

## 2022-10-03 RX ORDER — TOBRAMYCIN AND DEXAMETHASONE 3; 1 MG/G; MG/G
1 APPLICATION INTO THE LOWER EYELID OF AFFECTED EYE OINTMENT OPHTHALMIC
Refills: 0 | Status: ON HOLD | COMMUNITY
Start: 1900-01-01

## 2022-10-03 RX ORDER — LIDOCAINE 50 MG/G
PATCH CUTANEOUS
Qty: 0 | Refills: 0 | Status: ACTIVE | COMMUNITY
Start: 1900-01-01

## 2022-10-03 RX ORDER — ESTRADIOL 0.03 MG/D
1 PATCH TO SKIN PATCH TRANSDERMAL
Refills: 0 | Status: ACTIVE | COMMUNITY
Start: 1900-01-01

## 2022-10-03 RX ORDER — LEVOTHYROXINE SODIUM 0.05 MG/1
1 TABLET IN THE MORNING ON AN EMPTY STOMACH TABLET ORAL ONCE A DAY
Status: ACTIVE | COMMUNITY

## 2022-10-03 RX ORDER — GABAPENTIN 600 MG/1
1 TABLET TABLET, FILM COATED ORAL TID
Status: ACTIVE | COMMUNITY

## 2022-10-03 RX ORDER — DESIPRAMINE HYDROCHLORIDE 10 MG/1
1 TABLET TABLET, FILM COATED ORAL ONCE A DAY
Qty: 90 | Refills: 3 | Status: ACTIVE | COMMUNITY
Start: 2022-03-25

## 2022-10-03 RX ORDER — ST. JOHN'S WORT 300 MG
AS DIRECTED CAPSULE ORAL
Status: ACTIVE | COMMUNITY

## 2022-10-03 RX ORDER — PRAVASTATIN SODIUM 20 MG/1
1 TABLET TABLET ORAL ONCE A DAY
Status: ACTIVE | COMMUNITY

## 2022-10-03 RX ORDER — NIACIN 500 MG
1 TABLET WITH FOOD TABLET ORAL ONCE A DAY
Refills: 0 | Status: ACTIVE | COMMUNITY
Start: 1900-01-01

## 2022-10-03 RX ORDER — LORAZEPAM 1 MG/1
1 TABLET AT BEDTIME AS NEEDED TABLET ORAL ONCE A DAY
Refills: 0 | Status: ACTIVE | COMMUNITY
Start: 1900-01-01

## 2022-10-03 NOTE — HPI-TODAY'S VISIT:
81 yr old female seen previously in 3/2022 for abd pain  followed by Dr. Roger Ibarra.   previously followed for constipation and abdominal pain  here for follow up a copy of this note will be sent to referring physician.  had ongoing isuses with ruq pain x 5 yrs significant work up including egd/colon/ct and hida without obvious cause thought to be functional vs mesh vs arthritis.  previously improved on nexium she stopped it since due to side effect from nexium but unsure what the side effect was pain is worse with prolonged sitting and with standing hida with low ef.   saw dr. nabila peter who did not think it was related to the mesh she was seen by neurology for her jaw pain.  given neurontin 600mg tid but does not affect her abd pain unchanged over the last 5 years pain unrelated to eating or bm seems positional and relates to standing she is noting some constipation and is having some brb on wiping no blood in the stools no rectal pain or itching over the past 1 week only  otherwise, eating well still on pantoprazole no dysphagia no n/v normal appetite stable weight. prior hx of ibs but stable on fiber  prior work up done with normal cbc, cmp.  unremarkable ruq u/s.  no relief with bentyl.  zantac helps.   egd in 1/2019 with gastritis but no hp.   she had a ct in 4/2019 with no acute findings.  stable liver cysts.  normal gallbladder, pancreas, kidneys.   hida in 2020 with EF of 32% but had no pain during the test.   fecal elastace was normal on 3/2020. given xifaxan for sibo but didn't tolerated it.   colon in 2018 with diverticulosis but no polyps  colonoscopy in 10/2021 with polyps and diverticulosis.  3 yr f/u given  pt s/p total hysterecotomy.

## 2023-04-10 ENCOUNTER — OFFICE VISIT (OUTPATIENT)
Dept: URBAN - METROPOLITAN AREA CLINIC 80 | Facility: CLINIC | Age: 83
End: 2023-04-10